# Patient Record
Sex: FEMALE
[De-identification: names, ages, dates, MRNs, and addresses within clinical notes are randomized per-mention and may not be internally consistent; named-entity substitution may affect disease eponyms.]

---

## 2018-12-19 ENCOUNTER — RESULT REVIEW (OUTPATIENT)
Age: 58
End: 2018-12-19

## 2021-05-26 ENCOUNTER — RESULT REVIEW (OUTPATIENT)
Age: 61
End: 2021-05-26

## 2022-08-11 PROBLEM — Z00.00 ENCOUNTER FOR PREVENTIVE HEALTH EXAMINATION: Status: ACTIVE | Noted: 2022-08-11

## 2022-08-22 ENCOUNTER — APPOINTMENT (OUTPATIENT)
Dept: ORTHOPEDIC SURGERY | Facility: CLINIC | Age: 62
End: 2022-08-22

## 2022-08-22 VITALS — BODY MASS INDEX: 35.31 KG/M2 | WEIGHT: 225 LBS | HEIGHT: 67 IN

## 2022-08-22 DIAGNOSIS — I10 ESSENTIAL (PRIMARY) HYPERTENSION: ICD-10-CM

## 2022-08-22 DIAGNOSIS — Z78.9 OTHER SPECIFIED HEALTH STATUS: ICD-10-CM

## 2022-08-22 PROCEDURE — 99214 OFFICE O/P EST MOD 30 MIN: CPT | Mod: 25

## 2022-08-22 PROCEDURE — 20610 DRAIN/INJ JOINT/BURSA W/O US: CPT | Mod: 50

## 2022-08-22 RX ORDER — AMLODIPINE BESYLATE 5 MG/1
TABLET ORAL
Refills: 0 | Status: ACTIVE | COMMUNITY

## 2022-08-22 NOTE — DISCUSSION/SUMMARY
[de-identified] : not interested in TKA or ha at this time. plan for repeat bilateral csi today.  HEP/PT. \par \par \par The patient was advised of the diagnosis.  The natural history of the pathology was explained in full. All questions were answered.  The risks and benefits of conservative and interventional treatment alternatives were explained to the patient\par \par ------------------------------------------------------------------------------------------------------------------------------------------------------\par \par \par Large joint corticosteroid injection given: bilateral knees\par \par Patient indicated for injection after trial of rest, OTC medications including aspirin, Ibuprofen, Aleve etc or prescription NSAIDS, and/or exercises at home and/ or physical therapy without satisfactory response.  Patient has symptoms including pain, swelling, and/or decreased mobility in the joint. The risks, benefits, and alternatives to corticosteroid injection were explained in full to the patient, including but not limited to infection, sepsis, bleeding, scarring, skin discoloration, temporary increase in pain, syncopal episode, failure to resolve symptoms, allergic reaction, symptom recurrence, and elevation of blood sugar in diabetics. Patient understood the risks. All questions were answered. After discussion of options, patient requested an injection. \par \par Oral informed consent was obtained and sterile technique was utilized for the procedure including the preparation of the solutions used for the injection and betadine followed by alcohol prep to the injection site. Anesthesia was given with ethyl chloride sprayed topically. The injection was delivered. Patient tolerated the procedure well. \par \par Post Procedure Instructions: Patient was advised to call if redness, pain, or fever occur and apply ice for 15 min on and 15 min off later today\par \par Medications delivered: Depomedrol: 40 mg, Lidocaine: 4 cc\par \par ------------------------------------------------------------------------------------------------------------------------------------------------------\par

## 2022-08-22 NOTE — IMAGING
[de-identified] : \par Left knee exam: \par \par Skin: no significant pertinent finding\par Inspection: \par    min Effusion\par    (-) Malalignment\par    (-) Swelling\par    (-) Quad atrophy\par    (-) J-sign\par ROM: \par    0 - 135 degrees of flexion.\par Tenderness: \par    +MJLT\par    (-) LJLT\par    +Medial patellar facet tenderness\par    +Lateral patellar facet tenderness\par    +Crepitus\par    +Patellar grind tenderness\par    (-) Patellar tendon\par    (-) Quad tendon\par    Other: \par Stability: \par    (-) Lachman\par    (-) Varus/Valgus instability\par    (-) Posterior drawer\par    (-) Patellar translation: wnl\par Additional tests: \par    (-) McMurrays test\par    (-) Patellar apprehension\par    Other: \par Strength: 5/5 Q/H/TA/GS/EHL\par Neuro: In tact to light touch throughout, DTR's wnl\par Vascularity: Extremity warm and well perfused\par Gait: normal.\par \par ------------------------------------------------------------------------------------------------------------------------------------------------------\par  [Bilateral] : knee bilaterally [All Views] : anteroposterior, lateral, skyline, and anteroposterior standing [advanced 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

## 2022-08-22 NOTE — HISTORY OF PRESENT ILLNESS
[9] : 9 [7] : 7 [Dull/Aching] : dull/aching [Tightness] : tightness [Intermittent] : intermittent [Rest] : rest [Injection therapy] : injection therapy [Standing] : standing [Walking] : walking [Retired] : Work status: retired [de-identified] : 62 f with ongoing bilateral knee pain with severe oa. had csi in 9/21 and 2/22. did ok for 4 months.  [] : no [FreeTextEntry1] : bilateral knees [FreeTextEntry5] : no recent injury. Patient is a 62 year old female here today for bilateral knee pain. Patient complains about ongoing bilateral knee pain for many years. Has received injections in the past which help. [FreeTextEntry6] : stiffness [de-identified] : none

## 2023-01-26 ENCOUNTER — APPOINTMENT (OUTPATIENT)
Dept: ORTHOPEDIC SURGERY | Facility: CLINIC | Age: 63
End: 2023-01-26
Payer: COMMERCIAL

## 2023-01-26 VITALS — HEIGHT: 67 IN | WEIGHT: 225 LBS | BODY MASS INDEX: 35.31 KG/M2

## 2023-01-26 PROCEDURE — 99213 OFFICE O/P EST LOW 20 MIN: CPT | Mod: 25

## 2023-01-26 PROCEDURE — 20610 DRAIN/INJ JOINT/BURSA W/O US: CPT | Mod: 50

## 2023-01-26 NOTE — REASON FOR VISIT
[FreeTextEntry2] : bilateral knees follow up. Has relief from last CSI up until 1 week ago when pain flared up. No new injury.

## 2023-01-26 NOTE — IMAGING
[Bilateral] : knee bilaterally [All Views] : anteroposterior, lateral, skyline, and anteroposterior standing [advanced 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 [de-identified] : \par Bilateral knee exam: \par \par Skin: no significant pertinent finding\par Inspection: \par    min Effusion\par    (-) Malalignment\par    (-) Swelling\par    (-) Quad atrophy\par    (-) J-sign\par ROM: \par    0 - 125 degrees of flexion.\par Tenderness: \par    +MJLT\par    (-) LJLT\par    +Medial patellar facet tenderness\par    +Lateral patellar facet tenderness\par    +Crepitus\par    +Patellar grind tenderness\par    (-) Patellar tendon\par    (-) Quad tendon\par    Other: \par Stability: \par    (-) Lachman\par    (-) Varus/Valgus instability\par    (-) Posterior drawer\par    (-) Patellar translation: wnl\par Additional tests: \par    (-) McMurrays test\par    (-) Patellar apprehension\par    Other: \par Strength: 5/5 Q/H/TA/GS/EHL\par Neuro: In tact to light touch throughout, DTR's wnl\par Vascularity: Extremity warm and well perfused\par Gait: mildly antalgic.\par \par ------------------------------------------------------------------------------------------------------------------------------------------------------\par

## 2023-01-26 NOTE — HISTORY OF PRESENT ILLNESS
[7] : 7 [6] : 6 [Dull/Aching] : dull/aching [Tightness] : tightness [Intermittent] : intermittent [Rest] : rest [Injection therapy] : injection therapy [Standing] : standing [Walking] : walking [Retired] : Work status: retired [de-identified] : 62 f with ongoing bilateral knee pain with severe oa. had csi in 9/21 and 2/22. did ok for 4 months.  [] : no [FreeTextEntry1] : bilateral knees [FreeTextEntry5] : no recent injury. Patient is a 62 year old female here today for bilateral knee pain. Patient complains about ongoing bilateral knee pain for many years. Has received injections in the past which help. [FreeTextEntry6] : stiffness [de-identified] : none

## 2023-01-26 NOTE — DISCUSSION/SUMMARY
[de-identified] : Continues to not be interested in TKA or ha at this time. plan for repeat bilateral csi today.  HEP/PT. \par \par \par Progress note completed by Eric Humphries PA-C working as a scribe for Dr Griffin \par \par The patient was advised of the diagnosis.  The natural history of the pathology was explained in full. All questions were answered.  The risks and benefits of conservative and interventional treatment alternatives were explained to the patient\par \par ------------------------------------------------------------------------------------------------------------------------------------------------------\par \par \par Large joint corticosteroid injection given: bilateral knees\par \par Patient indicated for injection after trial of rest, OTC medications including aspirin, Ibuprofen, Aleve etc or prescription NSAIDS, and/or exercises at home and/ or physical therapy without satisfactory response.  Patient has symptoms including pain, swelling, and/or decreased mobility in the joint. The risks, benefits, and alternatives to corticosteroid injection were explained in full to the patient, including but not limited to infection, sepsis, bleeding, scarring, skin discoloration, temporary increase in pain, syncopal episode, failure to resolve symptoms, allergic reaction, symptom recurrence, and elevation of blood sugar in diabetics. Patient understood the risks. All questions were answered. After discussion of options, patient requested an injection. \par \par Oral informed consent was obtained and sterile technique was utilized for the procedure including the preparation of the solutions used for the injection and betadine followed by alcohol prep to the injection site. Anesthesia was given with ethyl chloride sprayed topically. The injection was delivered. Patient tolerated the procedure well. \par \par Post Procedure Instructions: Patient was advised to call if redness, pain, or fever occur and apply ice for 15 min on and 15 min off later today\par \par Medications delivered: Depomedrol: 40 mg, Lidocaine: 4 cc\par \par ------------------------------------------------------------------------------------------------------------------------------------------------------\par

## 2023-10-09 ENCOUNTER — APPOINTMENT (OUTPATIENT)
Dept: ORTHOPEDIC SURGERY | Facility: CLINIC | Age: 63
End: 2023-10-09
Payer: MEDICARE

## 2023-10-09 VITALS — WEIGHT: 225 LBS | BODY MASS INDEX: 35.31 KG/M2 | HEIGHT: 67 IN

## 2023-10-09 PROCEDURE — L1833: CPT | Mod: KV,KX,RT

## 2023-10-09 PROCEDURE — 73562 X-RAY EXAM OF KNEE 3: CPT | Mod: 50

## 2023-10-09 PROCEDURE — 99214 OFFICE O/P EST MOD 30 MIN: CPT | Mod: 25

## 2023-10-09 PROCEDURE — 20610 DRAIN/INJ JOINT/BURSA W/O US: CPT | Mod: 50

## 2024-04-25 ENCOUNTER — APPOINTMENT (OUTPATIENT)
Dept: ORTHOPEDIC SURGERY | Facility: CLINIC | Age: 64
End: 2024-04-25
Payer: MEDICARE

## 2024-04-25 VITALS — BODY MASS INDEX: 35.31 KG/M2 | WEIGHT: 225 LBS | HEIGHT: 67 IN

## 2024-04-25 DIAGNOSIS — M17.0 BILATERAL PRIMARY OSTEOARTHRITIS OF KNEE: ICD-10-CM

## 2024-04-25 PROCEDURE — 99213 OFFICE O/P EST LOW 20 MIN: CPT | Mod: 25

## 2024-04-25 PROCEDURE — 20610 DRAIN/INJ JOINT/BURSA W/O US: CPT | Mod: 50

## 2024-04-25 RX ORDER — ENALAPRIL MALEATE AND HYDROCHLOROTHIAZIDE 5; 12.5 MG/1; MG/1
TABLET ORAL
Refills: 0 | Status: ACTIVE | COMMUNITY

## 2024-04-25 NOTE — REASON FOR VISIT
[FreeTextEntry2] : bilateral knees follow up. noted 3 months of significant relief from csi last visit. Pain has since increased bl knees. no new injury

## 2024-04-25 NOTE — HISTORY OF PRESENT ILLNESS
[6] : 6 [0] : 0 [Dull/Aching] : dull/aching [Sharp] : sharp [Tightness] : tightness [Intermittent] : intermittent [Rest] : rest [Injection therapy] : injection therapy [Standing] : standing [Walking] : walking [Retired] : Work status: retired [de-identified] : 62 f with ongoing bilateral knee pain with severe oa. had csi in 9/21 and 2/22. did ok for 4 months.  [] : no [FreeTextEntry1] : bilateral knees [FreeTextEntry5] : no recent injury. Patient is a 62 year old female here today for bilateral knee pain. Patient complains about ongoing bilateral knee pain for many years. Has received injections in the past which help. [FreeTextEntry6] : stiffness [de-identified] : none

## 2024-04-25 NOTE — IMAGING
[Bilateral] : knee bilaterally [All Views] : anteroposterior, lateral, skyline, and anteroposterior standing [de-identified] : \par  Bilateral knee exam: \par  \par  Skin: no significant pertinent finding\par  Inspection: \par     min Effusion\par     (-) Malalignment\par     (-) Swelling\par     (-) Quad atrophy\par     (-) J-sign\par  ROM: \par     0 - 125 degrees of flexion.\par  Tenderness: \par     +MJLT\par     (-) LJLT\par     +Medial patellar facet tenderness\par     +Lateral patellar facet tenderness\par     +Crepitus\par     +Patellar grind tenderness\par     (-) Patellar tendon\par     (-) Quad tendon\par     Other: \par  Stability: \par     (-) Lachman\par     (-) Varus/Valgus instability\par     (-) Posterior drawer\par     (-) Patellar translation: wnl\par  Additional tests: \par     (-) McMurrays test\par     (-) Patellar apprehension\par     Other: \par  Strength: 5/5 Q/H/TA/GS/EHL\par  Neuro: In tact to light touch throughout, DTR's wnl\par  Vascularity: Extremity warm and well perfused\par  Gait: mildly antalgic.\par  \par  ------------------------------------------------------------------------------------------------------------------------------------------------------\par   [FreeTextEntry9] : severe tricompartmental DJD

## 2024-04-25 NOTE — DISCUSSION/SUMMARY
[de-identified] : Continues to not be interested in TKA or ha at this time. plan for repeat bilateral csi today.  HEP/PT.  discussed Euflexxa in future fu prn - may start gel in at next visit   ----------------------------------------------------------------------------   All relevant imaging studies pertinent to today's visit, including x-rays, MRI's and/or other advanced imaging studies (CT/etc) were independently interpreted and reviewed with the patient as needed. Implications of the studies together with the patient's clinical picture were discussed to formulate a working diagnosis and management options were detailed.   The patient and/or guardian was advised of the diagnosis. The natural history of the pathology was explained in full. All questions were answered. The risks and benefits of conservative and interventional treatment alternatives were explained to the patient  The patient and/or guardian was advised if any advanced diagnostic/imaging study (MRI/CT/etc) is ordered to evaluate potential pathology in the affected area(s), they should follow up in the office to review the results of the study and determine further management that may be indicated.    -----------------------------------------------   Large joint corticosteroid injection given: Bilateral knees   Patient indicated for injection after trial of rest, OTC medications including aspirin, Ibuprofen, Aleve etc or prescription NSAIDS, and/or exercises at home and/ or physical therapy without satisfactory response. Patient has symptoms including pain, swelling, and/or decreased mobility in the joint. The risks, benefits, and alternatives to corticosteroid injection were explained in full to the patient, including but not limited to infection, sepsis, bleeding, scarring, skin discoloration, temporary increase in pain, syncopal episode, failure to resolve symptoms, allergic reaction, symptom recurrence, and elevation of blood sugar in diabetics. Patient understood the risks. All questions were answered. After discussion of options, patient requested an injection.   Oral informed consent was obtained and sterile technique was utilized for the procedure including the preparation of the solutions used for the injection and betadine followed by alcohol prep to the injection site. Anesthesia was given with ethyl chloride sprayed topically. The injection was delivered. Patient tolerated the procedure well.   Post Procedure Instructions: Patient was advised to call if redness, pain, or fever occur and apply ice for 15 min on and 15 min off later today   Medications delivered: Kenalog 10 mg, Lidocaine: 4 cc per knee    Progress note completed by Eric Humphries PA-C working as a scribe for Dr Griffin

## 2024-07-25 ENCOUNTER — APPOINTMENT (OUTPATIENT)
Dept: ORTHOPEDIC SURGERY | Facility: CLINIC | Age: 64
End: 2024-07-25